# Patient Record
Sex: MALE | Race: WHITE | NOT HISPANIC OR LATINO
[De-identification: names, ages, dates, MRNs, and addresses within clinical notes are randomized per-mention and may not be internally consistent; named-entity substitution may affect disease eponyms.]

---
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
## 2019-01-04 ENCOUNTER — APPOINTMENT (OUTPATIENT)
Dept: ENDOCRINOLOGY | Facility: CLINIC | Age: 19
End: 2019-01-04
Payer: COMMERCIAL

## 2019-01-04 VITALS
BODY MASS INDEX: 18.94 KG/M2 | SYSTOLIC BLOOD PRESSURE: 110 MMHG | WEIGHT: 125 LBS | HEART RATE: 80 BPM | HEIGHT: 68 IN | DIASTOLIC BLOOD PRESSURE: 80 MMHG | OXYGEN SATURATION: 99 %

## 2019-01-04 DIAGNOSIS — Z78.9 OTHER SPECIFIED HEALTH STATUS: ICD-10-CM

## 2019-01-04 PROCEDURE — 99214 OFFICE O/P EST MOD 30 MIN: CPT

## 2019-01-04 RX ORDER — FLASH GLUCOSE SCANNING READER
EACH MISCELLANEOUS
Qty: 1 | Refills: 0 | Status: DISCONTINUED | COMMUNITY
Start: 2018-10-29 | End: 2019-01-04

## 2019-01-04 RX ORDER — GLUCAGON 1 MG
1 KIT INJECTION
Qty: 1 | Refills: 1 | Status: DISCONTINUED | COMMUNITY
Start: 2017-12-22 | End: 2019-01-04

## 2019-01-04 RX ORDER — ONDANSETRON 4 MG/1
4 TABLET, ORALLY DISINTEGRATING ORAL
Qty: 15 | Refills: 0 | Status: DISCONTINUED | COMMUNITY
Start: 2017-12-30 | End: 2019-01-04

## 2019-01-04 RX ORDER — LANCETS
EACH MISCELLANEOUS
Qty: 500 | Refills: 3 | Status: DISCONTINUED | COMMUNITY
Start: 2017-12-22 | End: 2019-01-04

## 2019-01-09 LAB
CREAT SPEC-SCNC: 215 MG/DL
GLUCOSE BLDC GLUCOMTR-MCNC: 158
HBA1C MFR BLD HPLC: 7.2
MICROALBUMIN 24H UR DL<=1MG/L-MCNC: 38.1 MG/DL
MICROALBUMIN/CREAT 24H UR-RTO: 177 MG/G

## 2019-03-06 ENCOUNTER — APPOINTMENT (OUTPATIENT)
Dept: PEDIATRIC GASTROENTEROLOGY | Facility: CLINIC | Age: 19
End: 2019-03-06
Payer: COMMERCIAL

## 2019-03-06 VITALS
DIASTOLIC BLOOD PRESSURE: 66 MMHG | WEIGHT: 130.07 LBS | SYSTOLIC BLOOD PRESSURE: 114 MMHG | HEART RATE: 88 BPM | BODY MASS INDEX: 19.49 KG/M2 | HEIGHT: 68.62 IN

## 2019-03-06 PROCEDURE — 99214 OFFICE O/P EST MOD 30 MIN: CPT

## 2019-03-12 ENCOUNTER — MEDICATION RENEWAL (OUTPATIENT)
Age: 19
End: 2019-03-12

## 2019-03-13 LAB
ENDOMYSIUM IGA SER QL: POSITIVE
ENDOMYSIUM IGA SER QL: POSITIVE
ENDOMYSIUM IGA TITR SER: ABNORMAL
ENDOMYSIUM IGA TITR SER: ABNORMAL
GLIADIN IGA SER QL: <5 UNITS
GLIADIN IGA SER QL: <5 UNITS
GLIADIN IGG SER QL: 5.4 UNITS
GLIADIN IGG SER QL: <5 UNITS
GLIADIN PEPTIDE IGA SER-ACNC: NEGATIVE
GLIADIN PEPTIDE IGA SER-ACNC: NEGATIVE
GLIADIN PEPTIDE IGG SER-ACNC: NEGATIVE
GLIADIN PEPTIDE IGG SER-ACNC: NEGATIVE
TTG IGA SER IA-ACNC: 57.4 UNITS
TTG IGA SER IA-ACNC: 8.2 U/ML
TTG IGA SER-ACNC: ABNORMAL
TTG IGA SER-ACNC: POSITIVE
TTG IGG SER IA-ACNC: 7.2 U/ML
TTG IGG SER IA-ACNC: 7.3 UNITS
TTG IGG SER IA-ACNC: ABNORMAL
TTG IGG SER IA-ACNC: NEGATIVE

## 2019-03-13 NOTE — REVIEW OF SYSTEMS
[Fever] : no fever [Icterus] : no icterus [Infections] : no infections [Asthma] : no asthma [Pneumonia] : no pneumonia [Murmur] : no murmur [Joint Pain] : no joint pain [AD(H)D] : no ADHD [Headache] : no headache [Anemia] : no anemia [Jaundice] : no jaundice [de-identified] : DM I

## 2019-03-13 NOTE — HISTORY OF PRESENT ILLNESS
[de-identified] : 17 yo young man with celiac disease and DM I returned for one of many follow-up visits.   He had two biopsies to define celiac disease and antibody status --- the first Marsh I; the second was clear of disease on the GFD..  He is into his second year of college at [employer] in [place].   (five visits ago visit was delayed and waiting 45 minutes since they put him in a room and did not notify me)   \sandhya Richardson presented in late 2015 after he was screened by Endocrinology for weight loss with his DMI but without abdominal pain, diarrhea, constipation, or other signs of celiac.   Antibodies were very high --- TTG-A >100 etc.   Biopsy November 2015 was unusual in that the distal duodenum was normal and bulb had lots of IEL's but no real damage and he was classified as Hendrickson I biopsy.   Options were offered and they chose to initiate the gluten free diet with repeat biopsy later.   He continued to feel well but his celiac antibodies particularly his TTG-A did not resolve as per routine and stayed elevated in ~ the upper 60's.   \par             He underwent repeat biopsy in October 2016 for both reasons ---the unresolving antibodies and his Hendrickson I original diagnosis.  On repeat there were no abnormalities seen.   He has continued on the gluten free diet which he says is very strict but at the same time at college he does eat out but at gluten free establishments.  He denies beer intake.    In his next to last visit in December, 2017  his TTG-A went slightly higher with the AGA somewhat lower.  When outright gluten accident occurs he may not have any symptoms.  Given the prior normal biopsies one could not make a significant statement about gluten intake.  On his last visit in July 2018 his TTG-A was higher and the AEA is 1:10 ).  \par          He continues with no symptoms.   He denies abdominal pain, vomiting, diarrhea, rashes, headaches, arthralgia or weight loss.   His weight has been fluctulated presently at the 12th %. He reports his DM I to be under good control.   He does not take MVI.

## 2019-03-13 NOTE — CONSULT LETTER
[Dear  ___] : Dear  [unfilled], [Consult Letter:] : I had the pleasure of evaluating your patient, [unfilled]. [Please see my note below.] : Please see my note below. [Consult Closing:] : Thank you very much for allowing me to participate in the care of this patient.  If you have any questions, please do not hesitate to contact me. [Sincerely,] : Sincerely, [FreeTextEntry3] : Mani Eastman MD, PhD\par

## 2019-03-13 NOTE — ASSESSMENT
[FreeTextEntry1] : Assessment:   celiac disease - strict on gluten free diet;  no complaints;\par                         positive celiac antibodies - unlike the routine situation, Dylan has never has never cleared his celiac antibodies after years of the GF diet.   He had a second biopsy in 2016 which at that time showed normal pathology despite the persistently elevated antibodies so the usual assessment is that his antibodies are slow to resolve.  Last visit there was an uptick in his TTG-A and since this was after his college experience we asked him to be especially vigilant regarding his gluten free diet for the next measurement.  He is now back after his next semester at college.\par                                               DM I - reported under good control;\par \par Plan: continue gluten free diet;\par         - encouraged MVI usage:\par        -obtain celiac antibodies and call for results;\par \par ADDENDUM: 3/13/19  MOTHER CALLED FOR RESULTS.   CELIAC ANTIBODIES SEEM IMPROVED IN THAT TTG-A WHICH HAD BEEN ALMOST AT 3X THE ULN WAS NOW SLIGHTLY OVER 2X THE ULN (METHOD AND NORMALS CHANGED DURING THIS INTERVAL).  OTHER ANTIBODIES SIMILAR.  IN GENERAL THIS SEEMS TO REFLECT SOME DEGREE OF STRICTNESS IN FOLLOWING THE GLUTEN FREE DIET EVEN AT COLLEGE.\par

## 2019-05-17 ENCOUNTER — APPOINTMENT (OUTPATIENT)
Dept: ENDOCRINOLOGY | Facility: CLINIC | Age: 19
End: 2019-05-17
Payer: COMMERCIAL

## 2019-05-17 VITALS
DIASTOLIC BLOOD PRESSURE: 80 MMHG | BODY MASS INDEX: 19.18 KG/M2 | SYSTOLIC BLOOD PRESSURE: 112 MMHG | HEIGHT: 68.62 IN | WEIGHT: 128 LBS | HEART RATE: 80 BPM | OXYGEN SATURATION: 98 %

## 2019-05-17 DIAGNOSIS — M79.10 MYALGIA, UNSPECIFIED SITE: ICD-10-CM

## 2019-05-17 LAB
ALBUMIN SERPL ELPH-MCNC: 4.7 G/DL
ALP BLD-CCNC: 66 U/L
ALT SERPL-CCNC: 15 U/L
ANION GAP SERPL CALC-SCNC: 10 MMOL/L
AST SERPL-CCNC: 16 U/L
BILIRUB SERPL-MCNC: 0.8 MG/DL
BUN SERPL-MCNC: 10 MG/DL
CALCIUM SERPL-MCNC: 10.2 MG/DL
CHLORIDE SERPL-SCNC: 101 MMOL/L
CHOLEST SERPL-MCNC: 137 MG/DL
CHOLEST/HDLC SERPL: 2.1 RATIO
CK SERPL-CCNC: 150 U/L
CO2 SERPL-SCNC: 29 MMOL/L
CREAT SERPL-MCNC: 0.72 MG/DL
CREAT SPEC-SCNC: 112 MG/DL
CRP SERPL-MCNC: <0.1 MG/DL
ERYTHROCYTE [SEDIMENTATION RATE] IN BLOOD BY WESTERGREN METHOD: 2 MM/HR
GLUCOSE BLDC GLUCOMTR-MCNC: 127
GLUCOSE SERPL-MCNC: 141 MG/DL
HBA1C MFR BLD HPLC: 8.9
HDLC SERPL-MCNC: 65 MG/DL
LDLC SERPL CALC-MCNC: 62 MG/DL
MICROALBUMIN 24H UR DL<=1MG/L-MCNC: 2.4 MG/DL
MICROALBUMIN/CREAT 24H UR-RTO: 21 MG/G
POTASSIUM SERPL-SCNC: 4.7 MMOL/L
PROT SERPL-MCNC: 7.1 G/DL
SODIUM SERPL-SCNC: 140 MMOL/L
TRIGL SERPL-MCNC: 50 MG/DL
TSH SERPL-ACNC: 2.44 UIU/ML

## 2019-05-17 PROCEDURE — 99214 OFFICE O/P EST MOD 30 MIN: CPT

## 2019-05-17 NOTE — ASSESSMENT
[Carbohydrate Consistent Diet] : carbohydrate consistent diet [Hypoglycemia Management] : hypoglycemia management [Glucagon Use] : glucagon use [Ketone Testing] : ketone testing [Diabetes Foot Care] : diabetes foot care [Long Term Vascular Complications] : long term vascular complications of diabetes [Self Monitoring of Blood Glucose] : self monitoring of blood glucose [Action and use of Insulin] : action and use of short and long-acting insulin [Insulin Self-Administration] : insulin self-administration [Injection Technique, Storage, Sharps Disposal] : injection technique, storage, and sharps disposal [Retinopathy Screening] : Patient was referred to ophthalmology for retinopathy screening [FreeTextEntry1] : 19yo male with type 1 diabetes on omnipod insulin pump and jose real time\par \par Type 1 diabetes: A1C improved, 6.9%\par I discussed with the patient and his parents the importance of glycemic control and targets.\par I went over Jose data, noted to have high glucose at around 6pm-8pm, 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  Sometimes over-correct and with hypoglycemia in the morning.  Does not want any changes in pump setting for now but will change behavior of avoiding the yogurt\par he does not want dexcom at this time because it does not link with omnipod\par he sees changes with exercise and can use temp basal for that\par he has celiac disease and is not always compliant with the diet.\par Insulin pump setting revised as below. \par 12AM - 3AM 0.850\par 3AM - 7 AM 0.700 \par 7AM - 12AM 0.800\par \par BOLUS SETTING\par ICR\par 12 am 1:12\par 12pm 1:10\par 5pm: 1:11\par 8pm: 1:12\par \par ISF 1:70\par INSULIN ACTIVE TIME 3 HOURS\par BG TARGET 120\par BG CORRECTION THRESHOLD 150\par \par Had normal cholesterol in May 2018, check today along with CMP\par \par MIcroalubminuria: continue with lisinopril. Check Microalbumin/creatinine ratio today. \par \par Celiac disease: Stable, continue to avoid gluten.\par \par Mucle weakness: Check ESR, CRP and Creatinine kinase, if elevated, consider rheum referral \par

## 2019-05-17 NOTE — ASSESSMENT
[Carbohydrate Consistent Diet] : carbohydrate consistent diet [Hypoglycemia Management] : hypoglycemia management [Glucagon Use] : glucagon use [Ketone Testing] : ketone testing [Diabetes Foot Care] : diabetes foot care [Long Term Vascular Complications] : long term vascular complications of diabetes [Self Monitoring of Blood Glucose] : self monitoring of blood glucose [Action and use of Insulin] : action and use of short and long-acting insulin [Injection Technique, Storage, Sharps Disposal] : injection technique, storage, and sharps disposal [Insulin Self-Administration] : insulin self-administration [Retinopathy Screening] : Patient was referred to ophthalmology for retinopathy screening [FreeTextEntry1] : 19yo male with type 1 diabetes on omnipod insulin pump and jose real time\par \par Type 1 diabetes: A1C improved, 6.9%\par I discussed with the patient and his parents the importance of glycemic control and targets.\par I went over Jose data, noted to have high glucose at around 6pm-8pm, 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  Sometimes over-correct and with hypoglycemia in the morning.  Does not want any changes in pump setting for now but will change behavior of avoiding the yogurt\par he does not want dexcom at this time because it does not link with omnipod\par he sees changes with exercise and can use temp basal for that\par he has celiac disease and is not always compliant with the diet.\par Insulin pump setting revised as below. \par 12AM - 3AM 0.850\par 3AM - 7 AM 0.700 \par 7AM - 12AM 0.800\par \par BOLUS SETTING\par ICR\par 12 am 1:12\par 12pm 1:10\par 5pm: 1:11\par 8pm: 1:12\par \par ISF 1:70\par INSULIN ACTIVE TIME 3 HOURS\par BG TARGET 120\par BG CORRECTION THRESHOLD 150\par \par Had normal cholesterol in May 2018, check today along with CMP\par \par MIcroalubminuria: continue with lisinopril. Check Microalbumin/creatinine ratio today. \par \par Celiac disease: Stable, continue to avoid gluten.\par \par Mucle weakness: Check ESR, CRP and Creatinine kinase, if elevated, consider rheum referral \par

## 2019-05-17 NOTE — HISTORY OF PRESENT ILLNESS
[FreeTextEntry1] : Patient is a 18-year-old male with history of type I diabetes, diagnosed in 2015.  He is here for follow up visit\par He also has celiac antibodies, avoids glutens.  \par He comes with his mother and father. He is a current sophomore in college.  Studying political science and prelaw. \par He had an episode of DKA in Oct 2018 due to gastroenteritis.  Resolved within 24 hours with fluid and insulin treatment.  \par He was diagnosed in May 2015. He was found to have ketones on routine exam and to be in DKA and was hospitalized.\par He has seen eye doctor last year does not have retinopathy or neuropathy.\par He has microalbuminuria and he is currently on lisinopril 2.5mg daily.\par \par In terms of diabetes treatment, he used insulin pen for about 4 months and has been on insulin pump since then.\par He uses omni pod and freestyle jasper sensor\par \par Today he feels well.  No complaints. \par \par UTD with eye doctor.  He reports he has been having some sharp/achy pain in the sole of his feet.  Also sometimes experience weakness in the thigh area.  Paternal grandmother with polymyositis, father is concerned. \par \par Regards to his celiac disease, he avoids fluid in his diet most of the time.  He denies any diarrhea.  He does have some difficulty with gaining weight. \par

## 2019-05-17 NOTE — PHYSICAL EXAM
[Alert] : alert [No Acute Distress] : no acute distress [Well Nourished] : well nourished [Well Developed] : well developed [Normal Sclera/Conjunctiva] : normal sclera/conjunctiva [No Proptosis] : no proptosis [EOMI] : extra ocular movement intact [Normal Oropharynx] : the oropharynx was normal [No Thyroid Nodules] : there were no palpable thyroid nodules [Thyroid Not Enlarged] : the thyroid was not enlarged [No Respiratory Distress] : no respiratory distress [No Accessory Muscle Use] : no accessory muscle use [Clear to Auscultation] : lungs were clear to auscultation bilaterally [Normal Rate] : heart rate was normal  [Normal S1, S2] : normal S1 and S2 [Regular Rhythm] : with a regular rhythm [Pedal Pulses Normal] : the pedal pulses are present [No Edema] : there was no peripheral edema [Normal Bowel Sounds] : normal bowel sounds [Not Tender] : non-tender [Soft] : abdomen soft [Not Distended] : not distended [Post Cervical Nodes] : posterior cervical nodes [Anterior Cervical Nodes] : anterior cervical nodes [Axillary Nodes] : axillary nodes [Normal] : normal and non tender [No Spinal Tenderness] : no spinal tenderness [Spine Straight] : spine straight [No Stigmata of Cushings Syndrome] : no stigmata of cushings syndrome [Normal Gait] : normal gait [Normal Strength/Tone] : muscle strength and tone were normal [No Rash] : no rash [Normal Reflexes] : deep tendon reflexes were 2+ and symmetric [No Tremors] : no tremors [Oriented x3] : oriented to person, place, and time [Acanthosis Nigricans] : no acanthosis nigricans

## 2019-06-03 ENCOUNTER — RX RENEWAL (OUTPATIENT)
Age: 19
End: 2019-06-03

## 2019-08-02 ENCOUNTER — TRANSCRIPTION ENCOUNTER (OUTPATIENT)
Age: 19
End: 2019-08-02

## 2019-08-02 ENCOUNTER — APPOINTMENT (OUTPATIENT)
Dept: ENDOCRINOLOGY | Facility: CLINIC | Age: 19
End: 2019-08-02
Payer: COMMERCIAL

## 2019-08-02 VITALS
BODY MASS INDEX: 19.77 KG/M2 | HEIGHT: 68.62 IN | WEIGHT: 132 LBS | DIASTOLIC BLOOD PRESSURE: 60 MMHG | OXYGEN SATURATION: 98 % | HEART RATE: 60 BPM | SYSTOLIC BLOOD PRESSURE: 100 MMHG

## 2019-08-02 LAB
GLUCOSE BLDC GLUCOMTR-MCNC: 129
HBA1C MFR BLD HPLC: 7.7

## 2019-08-02 PROCEDURE — 82962 GLUCOSE BLOOD TEST: CPT

## 2019-08-02 PROCEDURE — 99214 OFFICE O/P EST MOD 30 MIN: CPT | Mod: 25

## 2019-08-02 PROCEDURE — 83036 HEMOGLOBIN GLYCOSYLATED A1C: CPT | Mod: QW

## 2019-08-07 ENCOUNTER — APPOINTMENT (OUTPATIENT)
Dept: PEDIATRIC GASTROENTEROLOGY | Facility: CLINIC | Age: 19
End: 2019-08-07

## 2019-08-12 NOTE — HISTORY OF PRESENT ILLNESS
[FreeTextEntry1] : See jasper download, missing data from 3am to 6am. Pattern is still variable with higher glucose post breakfast time.

## 2019-08-12 NOTE — PHYSICAL EXAM
[Alert] : alert [No Acute Distress] : no acute distress [Well Nourished] : well nourished [Well Developed] : well developed [Normal Sclera/Conjunctiva] : normal sclera/conjunctiva [EOMI] : extra ocular movement intact [No Proptosis] : no proptosis [Normal Oropharynx] : the oropharynx was normal [Thyroid Not Enlarged] : the thyroid was not enlarged [No Thyroid Nodules] : there were no palpable thyroid nodules [No Respiratory Distress] : no respiratory distress [No Accessory Muscle Use] : no accessory muscle use [Clear to Auscultation] : lungs were clear to auscultation bilaterally [Normal Rate] : heart rate was normal  [Normal S1, S2] : normal S1 and S2 [Regular Rhythm] : with a regular rhythm [Pedal Pulses Normal] : the pedal pulses are present [No Edema] : there was no peripheral edema [Normal Bowel Sounds] : normal bowel sounds [Not Tender] : non-tender [Soft] : abdomen soft [Not Distended] : not distended [Post Cervical Nodes] : posterior cervical nodes [Anterior Cervical Nodes] : anterior cervical nodes [Axillary Nodes] : axillary nodes [No Spinal Tenderness] : no spinal tenderness [Spine Straight] : spine straight [No Stigmata of Cushings Syndrome] : no stigmata of cushings syndrome [Normal Gait] : normal gait [Normal Strength/Tone] : muscle strength and tone were normal [No Rash] : no rash [Normal] : normal [Full ROM] : with full range of motion [Normal Reflexes] : deep tendon reflexes were 2+ and symmetric [No Tremors] : no tremors [Oriented x3] : oriented to person, place, and time [Acanthosis Nigricans] : no acanthosis nigricans [Diminished Throughout Both Feet] : normal tactile sensation with monofilament testing throughout both feet

## 2019-08-12 NOTE — ASSESSMENT
[Carbohydrate Consistent Diet] : carbohydrate consistent diet [Hypoglycemia Management] : hypoglycemia management [Glucagon Use] : glucagon use [Ketone Testing] : ketone testing [Sick-Day Management] : sick-day management [Diabetes Foot Care] : diabetes foot care [Long Term Vascular Complications] : long term vascular complications of diabetes [Action and use of Insulin] : action and use of short and long-acting insulin [Importance of Diet and Exercise] : importance of diet and exercise to improve glycemic control, achieve weight loss and improve cardiovascular health [Self Monitoring of Blood Glucose] : self monitoring of blood glucose [Insulin Self-Administration] : insulin self-administration [Injection Technique, Storage, Sharps Disposal] : injection technique, storage, and sharps disposal [Retinopathy Screening] : Patient was referred to ophthalmology for retinopathy screening [FreeTextEntry1] : 19 year old male with type 1 diabetes on omni pod insulin pump and jasper real time\par \par #1 Type 1 diabete, moderately uncontrolled, A1C 7.7% today.  Does not want to change insulin pump setting, he attribute recent hyperglycemia due to stress/inomnia.  Informed patietnt to scan jasper sensor every 8 hours, specially around midnight if he's awake to get more data.  To e-mail me or CDE regarding download so we can make adjustments as needed.  \par he sees changes with exercise and can use temp basal for that\par he has celiac disease and is not always compliant with the diet.\par Insulin pump setting revised as below. \par 12AM - 3AM 0.850\par 3AM - 7 AM 0.700 \par 7AM - 12AM 0.800\par \par BOLUS SETTING\par ICR\par 12 am 1:12\par 12pm 1:10\par 5pm: 1:12\par \par ISF 1:70\par INSULIN ACTIVE TIME 3 HOURS\par BG TARGET 120\par BG CORRECTION THRESHOLD 150\par \par \par #2 Celiac disease: Stable, continue to avoid gluten.\par \par #3 foot pain/neuropathy?\par Referral for podiatry.  \par

## 2019-09-03 ENCOUNTER — RX RENEWAL (OUTPATIENT)
Age: 19
End: 2019-09-03

## 2019-11-27 ENCOUNTER — APPOINTMENT (OUTPATIENT)
Dept: PEDIATRIC GASTROENTEROLOGY | Facility: CLINIC | Age: 19
End: 2019-11-27
Payer: COMMERCIAL

## 2019-11-27 VITALS
WEIGHT: 133.6 LBS | HEIGHT: 69.33 IN | DIASTOLIC BLOOD PRESSURE: 69 MMHG | SYSTOLIC BLOOD PRESSURE: 122 MMHG | HEART RATE: 73 BPM | BODY MASS INDEX: 19.56 KG/M2

## 2019-11-27 PROCEDURE — 99213 OFFICE O/P EST LOW 20 MIN: CPT

## 2019-11-30 LAB
ENDOMYSIUM IGA SER QL: NEGATIVE
ENDOMYSIUM IGA SER QL: NEGATIVE
ENDOMYSIUM IGA TITR SER: NORMAL
ENDOMYSIUM IGA TITR SER: NORMAL
GLIADIN IGA SER QL: <5 UNITS
GLIADIN IGG SER QL: 5.7 UNITS
GLIADIN PEPTIDE IGA SER-ACNC: NEGATIVE
GLIADIN PEPTIDE IGG SER-ACNC: NEGATIVE
TTG IGA SER IA-ACNC: 5.3 U/ML
TTG IGA SER-ACNC: ABNORMAL
TTG IGG SER IA-ACNC: 9.1 U/ML
TTG IGG SER IA-ACNC: POSITIVE

## 2019-12-05 ENCOUNTER — CLINICAL ADVICE (OUTPATIENT)
Age: 19
End: 2019-12-05

## 2019-12-07 LAB
GLIADIN IGA SER QL: <5 UNITS
GLIADIN IGG SER QL: <5 UNITS
GLIADIN PEPTIDE IGA SER-ACNC: NEGATIVE
GLIADIN PEPTIDE IGG SER-ACNC: NEGATIVE
TTG IGA SER IA-ACNC: 5.4 U/ML
TTG IGA SER-ACNC: ABNORMAL
TTG IGG SER IA-ACNC: 5.6 U/ML
TTG IGG SER IA-ACNC: NEGATIVE

## 2019-12-07 NOTE — HISTORY OF PRESENT ILLNESS
[de-identified] : This is one of many follow-up visits for this now 19 year old young man who also has diabetes followed by our endocrinologists who is on a gluten free diet and is town for the Thanksgiving holiday since he is in college in [place], [place] ([facility] [employer] [facility] [employer]).\par \par At one of the prior follow-up visits there was a mix-up and a patient with a later appointment was seen first while they waited in the other room.   This appointment there again was a mix-up.  Their appointment was 2 PM and they arrived early about 1:43 when I was not present yet since I was coming over from nutrition rounds at the hospital.   Thus they were told I was not there yet and waited in the waiting room since it was warm in the patient rooms and they had already gone to the lab.   I arrived just before 2 PM and saw no patients in the rooms and noted that he was listed as being in the waiting room and therefore not ready.   I discussed scheduling in Dr. Jenkins's office.   It turns out the MOA's were only going to put him in a room after they saw me present since the patient and mother did not want to go in the room until I was in it.   The MOA's did not realize I was at 1991 until about 2:20 when they saw me in Dr. LITTLE's room.   I thus got to see Dylan at 2:23 when they thought that I had not been present until that time.\par \par Dylan is doing well and has no symptoms and says he is following a strict gluten free diet even at school.  He denies abdominal pain, diarrhea, constipation, weight loss, rashes, headaches, tiredness, or arthralgias.\par He had a repeat biopsy long ago for confirmation of his diagnosis which did show resolution.  Over the many  visits and antibody levels his values have occasionally increased instead of decreased but it has not been clearly associated with any gluten intake or problems.\par \par  He reports his DM I to be under good control.   He does not take MVI.\par \par His prior history is:\par Dylan presented in late 2015 after he was screened by Endocrinology for weight loss with his DMI but without abdominal pain, diarrhea, constipation, or other signs of celiac.   Antibodies were very high --- TTG-A >100 etc.   Biopsy November 2015 was unusual in that the distal duodenum was normal and bulb had lots of IEL's but no real damage and he was classified as Hendrickson I biopsy.   Options were offered and they chose to initiate the gluten free diet with repeat biopsy later.   He continued to feel well but his celiac antibodies particularly his TTG-A did not resolve as per routine and stayed elevated in ~ the upper 60's.   \par             He underwent repeat biopsy in October 2016 for both reasons ---the unresolving antibodies and his Hendrickson I original diagnosis.  On repeat there were no abnormalities seen.   He has continued on the gluten free diet which he says is very strict but at the same time at college he does eat out but at gluten free establishments.  He denies beer intake.    In his next to last visit in December, 2017  his TTG-A went slightly higher with the AGA somewhat lower.  When outright gluten accident occurs he may not have any symptoms.  Given the prior normal biopsies one could not make a significant statement about gluten intake.  On his last visit in July 2018 his TTG-A was higher and the AEA is 1:10 ).  \par  His weight has been fluctulated presently at the 12th %. He reports his DM I to be under good control.   He does not take MVI.

## 2019-12-07 NOTE — PHYSICAL EXAM
[Well Nourished] : well nourished [Well Developed] : well developed [NAD] : in no acute distress [Alert and Active] : alert and active [Verbal] : verbal [Appropriate Behavior] : appropriate behavior [icteric] : anicteric [Respiratory Distress] : no respiratory distress  [Distended] : non distended [Jaundice] : no jaundice [Edema] : no edema [FreeTextEntry2] : PER

## 2019-12-07 NOTE — CONSULT LETTER
[Dear  ___] : Dear  [unfilled], [Consult Letter:] : I had the pleasure of evaluating your patient, [unfilled]. [Consult Closing:] : Thank you very much for allowing me to participate in the care of this patient.  If you have any questions, please do not hesitate to contact me. [Please see my note below.] : Please see my note below. [Sincerely,] : Sincerely, [FreeTextEntry3] : Main Eastman MD, PhD\par  [FreeTextEntry1] : \par \par \par \par \par \par \par PLEASE SEE ADDENDUM IN ASSESSMENT-PLAN SECTION FOR DISCUSSION OF LAB VALUES.

## 2019-12-20 ENCOUNTER — APPOINTMENT (OUTPATIENT)
Dept: ENDOCRINOLOGY | Facility: CLINIC | Age: 19
End: 2019-12-20
Payer: COMMERCIAL

## 2019-12-20 VITALS
WEIGHT: 132 LBS | OXYGEN SATURATION: 98 % | BODY MASS INDEX: 19.33 KG/M2 | HEIGHT: 69.33 IN | DIASTOLIC BLOOD PRESSURE: 82 MMHG | SYSTOLIC BLOOD PRESSURE: 120 MMHG | HEART RATE: 80 BPM

## 2019-12-20 DIAGNOSIS — E10.9 TYPE 1 DIABETES MELLITUS W/OUT COMPLICATIONS: ICD-10-CM

## 2019-12-20 PROCEDURE — 99214 OFFICE O/P EST MOD 30 MIN: CPT

## 2019-12-24 NOTE — HISTORY OF PRESENT ILLNESS
[FreeTextEntry1] : Patient is a 19-year-old male with history of type I diabetes, diagnosed in 2015.  He is here for follow up visit. \par He also has celiac antibodies, avoids glutens.  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  With very variable schedule with school works.  \par \par He had an episode of DKA in Oct 2018 due to gastroenteritis.  Resolved within 24 hours with fluid and insulin treatment.  \par \par He was diagnosed in May 2015. He was found to have ketones on routine exam and to be in DKA and was hospitalized.\par \par He has seen eye doctor last year does not have retinopathy or neuropathy.\par \par He has microalbuminuria and he is currently on lisinopril 2.5mg daily.\par \par In terms of diabetes treatment, he used insulin pen for about 4 months and has been on insulin pump since then.\par \par He uses omni pod and FreeStyle jasper sensor\par \par Sleeps after 2 am, gets up at varies time, around 10am to 1pm. \par Lunch times is variable. \par Dinner is also very variable.  \par \par UTD with eye doctor.  His last visit was yesterday 12/19/2019.  He reports he has been having some sharp/achy pain in the sole of his feet. He had followed up with podiatry and it had resolved.  \par \par Regards to his celiac disease, he avoids fluid in his diet most of the time.  He denies any diarrhea. \par

## 2019-12-24 NOTE — ASSESSMENT
[FreeTextEntry1] : 19 year old male with type 1 diabetes on omni pod insulin pump and jasper real time\par \par #1 Type 1 diabete, moderately uncontrolled, A1C 7.7% today.  Does not want to change insulin pump setting, he attribute recent hyperglycemia due to stress/inomnia.  Informed patietnt to scan jasper sensor every 8 hours, specially around midnight if he's awake to get more data.  To e-mail me or CDE regarding download so we can make adjustments as needed.  \par he sees changes with exercise and can use temp basal for that\par he has celiac disease and is not always compliant with the diet.\par Insulin pump setting revised as below. \par 12AM - 3AM 0.850\par 3AM - 7 AM 0.7 -->  0.65\par 7AM - 12AM 0.800\par \par BOLUS SETTING\par ICR\par 12 am 1:12\par 12pm 1:10\par 5pm: 1:12 --> 1:10 \par \par ISF 1:70\par INSULIN ACTIVE TIME 3 HOURS\par BG TARGET 120\par BG CORRECTION THRESHOLD 150\par Eye exam yesterday. \par \par \par #2 Celiac disease: Stable, continue to avoid gluten.\par \par #3 foot pain/neuropathy?\par Referral for podiatry.  \par

## 2019-12-27 ENCOUNTER — RX RENEWAL (OUTPATIENT)
Age: 19
End: 2019-12-27

## 2019-12-27 LAB
ALBUMIN SERPL ELPH-MCNC: 4.7 G/DL
ALP BLD-CCNC: 66 U/L
ALT SERPL-CCNC: 18 U/L
ANION GAP SERPL CALC-SCNC: 11 MMOL/L
AST SERPL-CCNC: 13 U/L
BILIRUB SERPL-MCNC: 0.5 MG/DL
BUN SERPL-MCNC: 17 MG/DL
CALCIUM SERPL-MCNC: 10.2 MG/DL
CHLORIDE SERPL-SCNC: 102 MMOL/L
CO2 SERPL-SCNC: 25 MMOL/L
CREAT SERPL-MCNC: 1.38 MG/DL
CREAT SPEC-SCNC: 195 MG/DL
GLUCOSE BLDC GLUCOMTR-MCNC: 190
GLUCOSE SERPL-MCNC: 232 MG/DL
HBA1C MFR BLD HPLC: 7.9
MICROALBUMIN 24H UR DL<=1MG/L-MCNC: 9.7 MG/DL
MICROALBUMIN/CREAT 24H UR-RTO: 50 MG/G
POTASSIUM SERPL-SCNC: 5 MMOL/L
PROT SERPL-MCNC: 6.9 G/DL
SODIUM SERPL-SCNC: 138 MMOL/L

## 2020-01-03 ENCOUNTER — CLINICAL ADVICE (OUTPATIENT)
Age: 20
End: 2020-01-03

## 2020-01-03 LAB
ALBUMIN SERPL ELPH-MCNC: 4.6 G/DL
ALP BLD-CCNC: 57 U/L
ALT SERPL-CCNC: 20 U/L
ANION GAP SERPL CALC-SCNC: 11 MMOL/L
AST SERPL-CCNC: 18 U/L
BILIRUB SERPL-MCNC: 0.7 MG/DL
BUN SERPL-MCNC: 14 MG/DL
CALCIUM SERPL-MCNC: 10.1 MG/DL
CHLORIDE SERPL-SCNC: 101 MMOL/L
CO2 SERPL-SCNC: 28 MMOL/L
CREAT SERPL-MCNC: 0.69 MG/DL
CREAT SPEC-SCNC: 83 MG/DL
GLUCOSE SERPL-MCNC: 151 MG/DL
MICROALBUMIN 24H UR DL<=1MG/L-MCNC: <1.2 MG/DL
MICROALBUMIN/CREAT 24H UR-RTO: NORMAL MG/G
POTASSIUM SERPL-SCNC: 4.9 MMOL/L
PROT SERPL-MCNC: 7.1 G/DL
SODIUM SERPL-SCNC: 140 MMOL/L

## 2020-01-09 ENCOUNTER — RX RENEWAL (OUTPATIENT)
Age: 20
End: 2020-01-09

## 2020-01-11 ENCOUNTER — RX RENEWAL (OUTPATIENT)
Age: 20
End: 2020-01-11

## 2020-03-06 ENCOUNTER — APPOINTMENT (OUTPATIENT)
Dept: ENDOCRINOLOGY | Facility: CLINIC | Age: 20
End: 2020-03-06
Payer: COMMERCIAL

## 2020-03-06 VITALS
DIASTOLIC BLOOD PRESSURE: 70 MMHG | SYSTOLIC BLOOD PRESSURE: 120 MMHG | HEIGHT: 69.33 IN | WEIGHT: 137 LBS | HEART RATE: 76 BPM | OXYGEN SATURATION: 98 % | BODY MASS INDEX: 20.06 KG/M2

## 2020-03-06 LAB
GLUCOSE BLDC GLUCOMTR-MCNC: 200
HBA1C MFR BLD HPLC: 7.1

## 2020-03-06 PROCEDURE — 99214 OFFICE O/P EST MOD 30 MIN: CPT

## 2020-03-06 NOTE — HISTORY OF PRESENT ILLNESS
[FreeTextEntry1] : Patient is a 19-year-old male with history of type I diabetes, diagnosed in 2015.  He is here for follow up visit. \par He also has celiac antibodies, avoids glutens.  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  With very variable schedule with school works.  Reports that his insomnia had improved significantly during the semester.  He is currently on spring break.\par \par Patient with a recent viral illness, with cough, no fever.  Glucose has been fluctuating over the last few days due to viral illness, mostly running high.  States that he noticed today with does not eat a snack overnight he has hypo-glycemia in the morning.\par \par He had an episode of DKA in Oct 2018 due to gastroenteritis.  Resolved within 24 hours with fluid and insulin treatment.  \par \par He was diagnosed in May 2015. He was found to have ketones on routine exam and to be in DKA and was hospitalized.\par \par He has seen eye doctor last year does not have retinopathy or neuropathy.\par \par He has microalbuminuria and he is currently on lisinopril 2.5mg daily.  His last microalbumin was checked in November 2019 and found to be within normal limits.\par \par In terms of diabetes treatment, he used insulin pen for about 4 months and has been on insulin pump since then.\par \par He uses omni pod and FreeStyle jasper sensor, is considering T. Slim and Dexcom.\par \par Sleeps after 2 am, gets up at varies time, around 10am to 1pm. \par Lunch times is variable. \par Dinner is also very variable.  \par \par UTD with eye doctor.  His last visit was yesterday 12/19/2019.  He reports he has been having some sharp/achy pain in the sole of his feet. He had followed up with podiatry and it had resolved.  \par \par Regards to his celiac disease, he avoids fluid in his diet most of the time.  He denies any diarrhea. \par

## 2020-03-06 NOTE — ASSESSMENT
[FreeTextEntry1] : 19 year old male with type 1 diabetes on omni pod insulin pump and jasper real time\par \par #1 Type 1 diabetes, moderately uncontrolled, A1C 7.7% today.  Does not want to change insulin pump setting, he attribute recent hyperglycemia due to stress/insomnia.  Informed patient to scan jasper sensor every 8 hours, specially around midnight if he's awake to get more data.  To e-mail me or CDE regarding download so we can make adjustments as needed.  \par He sees changes with exercise and can use temp basal for that\par He has celiac disease and is not always compliant with the diet.\par Underwent a CGM study from February 22, 2020–March 6, 2020.  14 days of uninterrupted data were downloaded\par - Pt´s current therapeutic regimen includes: See diabetic therapy.\par - The patient´s blood sugars were in target 45 % of the time, above target 48 % of the time and below target 7 % of the time\par - Hypoglycemia: Below 70 mg/dL, less than 4%, with duration of 58 minutes total; below 54 mg/dL less than 1% with duration of 14 minutes total\par - Hyperglycemia: 25% 6-hour total.\par - Recommended therapeutic changes based on CGM Study:\par Patient with a recent viral illness, large fluctuations of glucose, did noted to have hypoglycemia in the morning.  Recommend patient to continue with CGM use.  Recommend to try taking a small snack at bedtime.  Recommend to consider decreasing basal overnight if it is not improved\par Insulin pump setting revised as below. \par 12AM - 3AM 0.850\par 3AM - 7 AM 0.65\par 7AM - 12AM 0.800\par \par BOLUS SETTING\par ICR\par 12 am 1:12\par 12pm 1:10\par 5pm: 1:12 --> 1:10 \par \par ISF 1:70\par INSULIN ACTIVE TIME 3 HOURS\par BG TARGET 120\par BG CORRECTION THRESHOLD 150\par Patient is considering closed-loop system with T slim and Dexcom with control IQ.  He will research into it and get back to us.\par \par #2 Celiac disease: Stable, continue to avoid gluten.\par \par #3 foot pain/neuropathy\par He saw podiatrist and now improved.  \par \par #4 microalbuminuria\par Patient is currently on lisinopril 2.5 mg once daily.  Microalbumin check in December 2020 is within normal limits.  We will continue with current regimen.\par  [Carbohydrate Consistent Diet] : carbohydrate consistent diet [Hypoglycemia Management] : hypoglycemia management [Glucagon Use] : glucagon use [Ketone Testing] : ketone testing [Sick-Day Management] : sick-day management [Diabetes Foot Care] : diabetes foot care [Long Term Vascular Complications] : long term vascular complications of diabetes [Importance of Diet and Exercise] : importance of diet and exercise to improve glycemic control, achieve weight loss and improve cardiovascular health [Action and use of Insulin] : action and use of short and long-acting insulin [Self Monitoring of Blood Glucose] : self monitoring of blood glucose [Insulin Self-Administration] : insulin self-administration [Injection Technique, Storage, Sharps Disposal] : injection technique, storage, and sharps disposal [Retinopathy Screening] : Patient was referred to ophthalmology for retinopathy screening

## 2020-03-06 NOTE — PHYSICAL EXAM
[Alert] : alert [No Acute Distress] : no acute distress [Well Nourished] : well nourished [Well Developed] : well developed [Normal Sclera/Conjunctiva] : normal sclera/conjunctiva [EOMI] : extra ocular movement intact [No Proptosis] : no proptosis [Normal Oropharynx] : the oropharynx was normal [Thyroid Not Enlarged] : the thyroid was not enlarged [No Thyroid Nodules] : there were no palpable thyroid nodules [No Respiratory Distress] : no respiratory distress [No Accessory Muscle Use] : no accessory muscle use [Clear to Auscultation] : lungs were clear to auscultation bilaterally [Normal Rate] : heart rate was normal  [Normal S1, S2] : normal S1 and S2 [Regular Rhythm] : with a regular rhythm [Normal Bowel Sounds] : normal bowel sounds [Not Tender] : non-tender [Soft] : abdomen soft [Not Distended] : not distended [Post Cervical Nodes] : posterior cervical nodes [Anterior Cervical Nodes] : anterior cervical nodes [Axillary Nodes] : axillary nodes [Normal] : normal and non tender [No Spinal Tenderness] : no spinal tenderness [Spine Straight] : spine straight [No Stigmata of Cushings Syndrome] : no stigmata of cushings syndrome [Normal Gait] : normal gait [Normal Strength/Tone] : muscle strength and tone were normal [No Rash] : no rash [Acanthosis Nigricans] : no acanthosis nigricans [Normal Reflexes] : deep tendon reflexes were 2+ and symmetric [No Tremors] : no tremors [Oriented x3] : oriented to person, place, and time [Normal Affect] : the affect was normal

## 2020-08-14 ENCOUNTER — APPOINTMENT (OUTPATIENT)
Dept: ENDOCRINOLOGY | Facility: CLINIC | Age: 20
End: 2020-08-14
Payer: COMMERCIAL

## 2020-08-14 VITALS
DIASTOLIC BLOOD PRESSURE: 80 MMHG | WEIGHT: 133 LBS | HEART RATE: 100 BPM | HEIGHT: 69.33 IN | BODY MASS INDEX: 19.48 KG/M2 | OXYGEN SATURATION: 97 % | SYSTOLIC BLOOD PRESSURE: 110 MMHG

## 2020-08-14 LAB
GLUCOSE BLDC GLUCOMTR-MCNC: 186
HBA1C MFR BLD HPLC: 7.6

## 2020-08-14 PROCEDURE — 95251 CONT GLUC MNTR ANALYSIS I&R: CPT

## 2020-08-14 PROCEDURE — 99214 OFFICE O/P EST MOD 30 MIN: CPT | Mod: 25

## 2020-08-14 RX ORDER — URINE ACETONE TEST STRIPS
STRIP MISCELLANEOUS
Qty: 1 | Refills: 1 | Status: ACTIVE | COMMUNITY
Start: 2017-12-22 | End: 1900-01-01

## 2020-08-14 NOTE — PHYSICAL EXAM
[Well Nourished] : well nourished [Alert] : alert [Well Developed] : well developed [No Acute Distress] : no acute distress [Normal Sclera/Conjunctiva] : normal sclera/conjunctiva [EOMI] : extra ocular movement intact [Thyroid Not Enlarged] : the thyroid was not enlarged [No Proptosis] : no proptosis [Normal Oropharynx] : the oropharynx was normal [No Respiratory Distress] : no respiratory distress [No Thyroid Nodules] : no palpable thyroid nodules [No Accessory Muscle Use] : no accessory muscle use [Normal S1, S2] : normal S1 and S2 [Clear to Auscultation] : lungs were clear to auscultation bilaterally [Normal Rate] : heart rate was normal [No Edema] : no peripheral edema [Regular Rhythm] : with a regular rhythm [Pedal Pulses Normal] : the pedal pulses are present [Not Tender] : non-tender [Normal Bowel Sounds] : normal bowel sounds [Soft] : abdomen soft [Not Distended] : not distended [Normal Anterior Cervical Nodes] : no anterior cervical lymphadenopathy [Spine Straight] : spine straight [No Spinal Tenderness] : no spinal tenderness [Normal Posterior Cervical Nodes] : no posterior cervical lymphadenopathy [No Stigmata of Cushings Syndrome] : no stigmata of Cushings Syndrome [Normal Gait] : normal gait [Normal Strength/Tone] : muscle strength and tone were normal [No Rash] : no rash [Acanthosis Nigricans] : no acanthosis nigricans [Normal Reflexes] : deep tendon reflexes were 2+ and symmetric [No Tremors] : no tremors [Oriented x3] : oriented to person, place, and time

## 2020-08-14 NOTE — ASSESSMENT
[FreeTextEntry1] : 19 year old male with type 1 diabetes on omni pod insulin pump and jasper real time\par \par #1 Type 1 diabetes, moderately uncontrolled, A1C 7.7% today.  Does not want to change insulin pump setting, he attribute recent hyperglycemia due to stress/insomnia.  Informed patient to scan jasper sensor every 8 hours, specially around midnight if he's awake to get more data.  To e-mail me or CDE regarding download so we can make adjustments as needed.  \par He sees changes with exercise and can use temp basal for that\par He has celiac disease and is not always compliant with the diet.\par Underwent a CGM study from February 22, 2020–March 6, 2020.  14 days of uninterrupted data were downloaded\par - Pt´s current therapeutic regimen includes: See diabetic therapy.\par - The patient´s blood sugars were in target 45 % of the time, above target 48 % of the time and below target 7 % of the time\par - Hypoglycemia: Below 70 mg/dL, less than 4%, with duration of 58 minutes total; below 54 mg/dL less than 1% with duration of 14 minutes total\par - Hyperglycemia: 25% 6-hour total.\par - Recommended therapeutic changes based on CGM Study:\par Patient with a recent viral illness, large fluctuations of glucose, did noted to have hypoglycemia in the morning.  Recommend patient to continue with CGM use.  Recommend to try taking a small snack at bedtime.  Recommend to consider decreasing basal overnight if it is not improved\par Underwent a CGM study from August 1, 2020–August 14, 2020.  14 days of uninterrupted data were downloaded\par ***\par - Pt´s current therapeutic regimen includes: Insulin pump with the above setting.\par - The patient´s blood sugars were in target 35 % of the time, above target 59 % of the time and below target 6 % of the time\par - Hypoglycemia: The patient had 3 low glucose events, with average duration of 235 minutes.  0 of these were severe (<50mg/dL or requiring assistance). The hypoglycemic events typically between 3 AM and 6 AM\par - Hyperglycemia: Between 12 AM to 3 AM between 12 PM to 12 AM\par - Recommended therapeutic changes based on CGM Study:\par Please see the following changes on insulin pump\par Insulin pump setting revised as below. \par 12AM - 3AM 0.850\par 3AM - 7 AM 0.60\par 7AM - 12AM 0.850\par \par BOLUS SETTING\par ICR\par 12 am 1:12\par 12pm 1:10\par 5pm: 1:10 \par \par ISF 1:70\par INSULIN ACTIVE TIME 3 HOURS\par BG TARGET 110\par BG CORRECTION THRESHOLD 140\par ***\par #2 Celiac disease: Stable, continue to avoid gluten.\par \par #3 foot pain/neuropathy\par He saw podiatrist and now improved.  \par \par #4 microalbuminuria\par Patient is currently on lisinopril 2.5 mg once daily.  Microalbumin check in December 2020 is within normal limits.  We will continue with current regimen. Check microablumin today. \par  [Diabetes Foot Care] : diabetes foot care [Long Term Vascular Complications] : long term vascular complications of diabetes [Carbohydrate Consistent Diet] : carbohydrate consistent diet [Exercise/Effect on Glucose] : exercise/effect on glucose [Importance of Diet and Exercise] : importance of diet and exercise to improve glycemic control, achieve weight loss and improve cardiovascular health [Hypoglycemia Management] : hypoglycemia management [Glucagon Use] : glucagon use [Action and use of Insulin] : action and use of short and long-acting insulin [Ketone Testing] : ketone testing [Self Monitoring of Blood Glucose] : self monitoring of blood glucose [Insulin Self-Administration] : insulin self-administration [Sick-Day Management] : sick-day management [Injection Technique, Storage, Sharps Disposal] : injection technique, storage, and sharps disposal [Retinopathy Screening] : Patient was referred to ophthalmology for retinopathy screening

## 2020-08-14 NOTE — HISTORY OF PRESENT ILLNESS
[FreeTextEntry1] : Patient is a 20-year-old male with history of type I diabetes, diagnosed in 2015.  He is here for follow up visit. \par He also has celiac antibodies, avoids glutens.  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  With very variable schedule with school works.  Reports that his insomnia had improved significantly during the semester.  Currently on summer break, going back to school in a couple of days.\par \par He had an episode of DKA in Oct 2018 due to gastroenteritis.  Resolved within 24 hours with fluid and insulin treatment.  \par \par He was diagnosed in May 2015. He was found to have ketones on routine exam and to be in DKA and was hospitalized.\par \par He has seen eye doctor last year does not have retinopathy or neuropathy.\par \par He has microalbuminuria and he is currently on lisinopril 2.5mg daily.  His last microalbumin was checked in November 2019 and found to be within normal limits.\par \par In terms of diabetes treatment, he used insulin pen for about 4 months and has been on insulin pump since then.\par \par He uses omni pod and FreeStyle jasper sensor, is considering T. Slim and Dexcom.\par \par He is up-to-date with his podiatrist seen within this year.  He is up-to-date with his ophthalmologist, also seen within this year.\par \par Regards to his celiac disease, he avoid gluten in his diet most of the time.  He denies any diarrhea. \par

## 2020-08-17 LAB
ALBUMIN SERPL ELPH-MCNC: 5.1 G/DL
ALP BLD-CCNC: 65 U/L
ALT SERPL-CCNC: 13 U/L
ANION GAP SERPL CALC-SCNC: 11 MMOL/L
AST SERPL-CCNC: 14 U/L
BILIRUB SERPL-MCNC: 0.8 MG/DL
BUN SERPL-MCNC: 12 MG/DL
CALCIUM SERPL-MCNC: 10.5 MG/DL
CHLORIDE SERPL-SCNC: 99 MMOL/L
CHOLEST SERPL-MCNC: 148 MG/DL
CHOLEST/HDLC SERPL: 2.7 RATIO
CO2 SERPL-SCNC: 29 MMOL/L
CREAT SERPL-MCNC: 0.79 MG/DL
CREAT SPEC-SCNC: 319 MG/DL
GLUCOSE SERPL-MCNC: 181 MG/DL
HDLC SERPL-MCNC: 54 MG/DL
LDLC SERPL CALC-MCNC: 83 MG/DL
MICROALBUMIN 24H UR DL<=1MG/L-MCNC: 26.1 MG/DL
MICROALBUMIN/CREAT 24H UR-RTO: 82 MG/G
POTASSIUM SERPL-SCNC: 4.9 MMOL/L
PROT SERPL-MCNC: 7.4 G/DL
SODIUM SERPL-SCNC: 139 MMOL/L
TRIGL SERPL-MCNC: 50 MG/DL
TSH SERPL-ACNC: 2.01 UIU/ML

## 2020-11-09 RX ORDER — FLASH GLUCOSE SENSOR
KIT MISCELLANEOUS
Qty: 6 | Refills: 1 | Status: DISCONTINUED | COMMUNITY
Start: 2019-01-04 | End: 2020-11-09

## 2020-11-09 RX ORDER — FLASH GLUCOSE SCANNING READER
EACH MISCELLANEOUS
Qty: 1 | Refills: 0 | Status: ACTIVE | COMMUNITY
Start: 2020-11-09

## 2020-11-09 RX ORDER — FLASH GLUCOSE SENSOR
KIT MISCELLANEOUS
Qty: 6 | Refills: 3 | Status: DISCONTINUED | COMMUNITY
Start: 2018-10-29 | End: 2020-11-09

## 2020-11-30 ENCOUNTER — APPOINTMENT (OUTPATIENT)
Dept: ENDOCRINOLOGY | Facility: CLINIC | Age: 20
End: 2020-11-30

## 2020-12-19 ENCOUNTER — TRANSCRIPTION ENCOUNTER (OUTPATIENT)
Age: 20
End: 2020-12-19

## 2020-12-21 ENCOUNTER — APPOINTMENT (OUTPATIENT)
Dept: ENDOCRINOLOGY | Facility: CLINIC | Age: 20
End: 2020-12-21
Payer: COMMERCIAL

## 2020-12-21 ENCOUNTER — RESULT CHARGE (OUTPATIENT)
Age: 20
End: 2020-12-21

## 2020-12-21 VITALS
SYSTOLIC BLOOD PRESSURE: 118 MMHG | BODY MASS INDEX: 21.23 KG/M2 | WEIGHT: 145 LBS | OXYGEN SATURATION: 98 % | DIASTOLIC BLOOD PRESSURE: 72 MMHG | TEMPERATURE: 97.3 F | HEART RATE: 83 BPM | HEIGHT: 69.3 IN

## 2020-12-21 DIAGNOSIS — R80.9 PROTEINURIA, UNSPECIFIED: ICD-10-CM

## 2020-12-21 LAB
CREAT SPEC-SCNC: 149 MG/DL
GLUCOSE BLDC GLUCOMTR-MCNC: 242
HBA1C MFR BLD HPLC: 7.7
MICROALBUMIN 24H UR DL<=1MG/L-MCNC: 2.5 MG/DL
MICROALBUMIN/CREAT 24H UR-RTO: 17 MG/G

## 2020-12-21 PROCEDURE — 95251 CONT GLUC MNTR ANALYSIS I&R: CPT

## 2020-12-21 PROCEDURE — 99072 ADDL SUPL MATRL&STAF TM PHE: CPT

## 2020-12-21 PROCEDURE — 82962 GLUCOSE BLOOD TEST: CPT

## 2020-12-21 PROCEDURE — 99214 OFFICE O/P EST MOD 30 MIN: CPT | Mod: 25

## 2020-12-21 NOTE — THERAPY
[Today's Date] : [unfilled] [Novolog] : Novolog [_____] :  [unfilled] mg/dL [Insulin on Board (IOB) Duration = ____ hours] : Insulin on Board (IOB) Duration  = [unfilled] hours [de-identified] : OMNIPOD [FreeTextEntry7] : none

## 2020-12-21 NOTE — HISTORY OF PRESENT ILLNESS
[FreeTextEntry1] : Patient is a 20-year-old male with history of type I diabetes, diagnosed in 2015.  He is here for follow up visit. \par He also has celiac antibodies, avoids glutens.  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  With very variable schedule with school works.  Reports that his insomnia had improved significantly during the semester.  Currently on summer break, going back to school in a couple of days.\par \par He had an episode of DKA in Oct 2018 due to gastroenteritis.  Resolved within 24 hours with fluid and insulin treatment.  \par \par He was diagnosed in May 2015. He was found to have ketones on routine exam and to be in DKA and was hospitalized.\par \par He has seen eye doctor last year does not have retinopathy or neuropathy.\par \par He has microalbuminuria and he is currently on lisinopril 2.5mg daily.  His last microalbumin was checked in November 2019 and found to be within normal limits.\par \par In terms of diabetes treatment, he used insulin pen for about 4 months and has been on insulin pump since then.\par \par He uses omni pod and FreeStyle jasper sensor, is considering T. Slim and Dexcom.\par \par He is up-to-date with his podiatrist seen within this year.  He is up-to-date with his ophthalmologist, also seen within this year.\par \par Regards to his celiac disease, he avoid gluten in his diet most of the time.  He denies any diarrhea. \par \par Morning: chicken ring and clam chowder and some fries\par 5:30pm: miso soup (12 carbs) Asian food \par \par Noticing more scar tissue.  Uses the insulin pump and sensor on his bilateral thighs. \par

## 2020-12-21 NOTE — ASSESSMENT
[FreeTextEntry1] : 19 year old male with type 1 diabetes on omni pod insulin pump and jose real time\par \par #1 Type 1 diabetes, moderately uncontrolled, A1C 7.7% today.  \par Underwent a CGM study from December 8, 2020–December 21, 2020.  14 days of uninterrupted data were downloaded\par - The reason for the study was hypoglycemia\par - Pt´s current therapeutic regimen includes: OmniPod insulin pump as above.\par - The patient´s blood sugars were in target 35% of the time, above target 64% of the time and below target 1% of the time\par - Hypoglycemia: The patient had 1 low glucose events, with average duration of 14 minutes.  None of these were severe (<50mg/dL or requiring assistance). The hypoglycemic events typically at noon time. \par - Hyperglycemia: overnight\par - Recommended therapeutic changes based on CGM Study:adjust insulin pump setting as below: \par Diabetes Therapy Regimen \par Date: 12/21/2020 \par Bolus Insulin: Novolog \par Pump Model: OMNIPOD \par Basal Rate \par Start Time: 0000 Basal: 0.850 units/hour ==> 0.900\par Start Time: 0300 Basal: 0.600 units/hour  ==> 0.700\par Start Time: 0700 Basal: 0.850 units/hour       \par Carb Ratios \par Start Time: 0000 Carb Ratios: 12 grams/unit \par Start Time: 1200 Carb Ratios: 10 grams/unit        \par Sensitvity \par Start Time: 0000 Sensitivity: 70 mg/dL/U   ==> 60\par BG Target Ranges \par Start Time: 0000 BG Target Ranges: 110-140 mg/dL         \par Correction Insulin: (Blood Glucose Minus Target) divided by sensitivity factor \par Insulin on Board (IOB) Duration = 3 hours \par To share Jose sensor download with us via View and Chew in 2 weeks\par Instruction for [employer] provided \par \par #2 Celiac disease: Stable, continue to avoid gluten.\par \par #3 foot pain/neuropathy\par He saw podiatrist and now improved.  \par \par #4 microalbuminuria\par Patient is currently on lisinopril 2.5 mg once daily.  Microalbumin check in December 2020 is within normal limits.  We will continue with current regimen. Check microablumin today. \par  [Diabetes Foot Care] : diabetes foot care [Long Term Vascular Complications] : long term vascular complications of diabetes [Carbohydrate Consistent Diet] : carbohydrate consistent diet [Importance of Diet and Exercise] : importance of diet and exercise to improve glycemic control, achieve weight loss and improve cardiovascular health [Exercise/Effect on Glucose] : exercise/effect on glucose [Hypoglycemia Management] : hypoglycemia management [Glucagon Use] : glucagon use [Ketone Testing] : ketone testing [Action and use of Insulin] : action and use of short and long-acting insulin [Self Monitoring of Blood Glucose] : self monitoring of blood glucose [Insulin Self-Administration] : insulin self-administration [Injection Technique, Storage, Sharps Disposal] : injection technique, storage, and sharps disposal [Sick-Day Management] : sick-day management [Retinopathy Screening] : Patient was referred to ophthalmology for retinopathy screening

## 2020-12-22 ENCOUNTER — NON-APPOINTMENT (OUTPATIENT)
Age: 20
End: 2020-12-22

## 2020-12-22 LAB
25(OH)D3 SERPL-MCNC: 30 NG/ML
GLIADIN IGA SER QL: <5 UNITS
GLIADIN IGG SER QL: 6.7 UNITS
GLIADIN PEPTIDE IGA SER-ACNC: NEGATIVE
GLIADIN PEPTIDE IGG SER-ACNC: NEGATIVE
TTG IGA SER IA-ACNC: 6.4 U/ML
TTG IGA SER-ACNC: ABNORMAL
TTG IGG SER IA-ACNC: 7 U/ML
TTG IGG SER IA-ACNC: ABNORMAL

## 2021-05-04 ENCOUNTER — APPOINTMENT (OUTPATIENT)
Dept: ENDOCRINOLOGY | Facility: CLINIC | Age: 21
End: 2021-05-04
Payer: COMMERCIAL

## 2021-05-04 VITALS
HEART RATE: 76 BPM | DIASTOLIC BLOOD PRESSURE: 74 MMHG | OXYGEN SATURATION: 97 % | WEIGHT: 142 LBS | TEMPERATURE: 97.8 F | SYSTOLIC BLOOD PRESSURE: 117 MMHG

## 2021-05-04 DIAGNOSIS — K90.0 CELIAC DISEASE: ICD-10-CM

## 2021-05-04 PROCEDURE — 82962 GLUCOSE BLOOD TEST: CPT

## 2021-05-04 PROCEDURE — 83036 HEMOGLOBIN GLYCOSYLATED A1C: CPT | Mod: QW

## 2021-05-04 PROCEDURE — 99072 ADDL SUPL MATRL&STAF TM PHE: CPT

## 2021-05-04 PROCEDURE — 99214 OFFICE O/P EST MOD 30 MIN: CPT | Mod: 25

## 2021-05-04 PROCEDURE — 95251 CONT GLUC MNTR ANALYSIS I&R: CPT

## 2021-05-04 NOTE — THERAPY
[Today's Date] : [unfilled] [Novolog] : Novolog [_____] :  [unfilled] mg/dL [Insulin on Board (IOB) Duration = ____ hours] : Insulin on Board (IOB) Duration  = [unfilled] hours [Max Bolus (units) = ____] : Max Bolus = [unfilled] units [de-identified] : OmniPod system serial #[id number]

## 2021-05-04 NOTE — ASSESSMENT
[FreeTextEntry1] : 19 year old male with type 1 diabetes on omni pod insulin pump and jasper real time\par \par 1.  Type 1 diabetes, moderately controlled\par A1c today 7.0%.  Improvement compared to last visit of 7.7%.\par Recently graduated from [employer], moving back to [place].  Might eventually moved to [place].\par Continue with current insulin pump setting.\par Date: 05/04/2021 \par Bolus Insulin: Novolog \par Pump Model: OmniPod system serial #[id number] \par Basal Rate \par Start Time: 0000 Basal: 0.9 units/hour \par Start Time: 0300 Basal: 0.7 units/hour \par Start Time: 0700 Basal: 0.850 units/hour  19.950 units/hour      \par Carb Ratios \par Start Time: Midnight Carb Ratios: 12 grams/unit \par Start Time: 12 PM Carb Ratios: 10 grams/unit        \par Sensitvity \par Start Time: Midnight Sensitivity: 70 mg/dL/U         \par BG Target Ranges \par Start Time: Midnight BG Target Ranges: 100–1 40 mg/dL         \par Correction Insulin: (Blood Glucose Minus Target) divided by sensitivity factor \par Insulin on Board (IOB) Duration = 3 hours hours \par Max Bolus =12 units\par Ophthalmology: Up-to-date\par Podiatry: Up-to-date, no further neuropathy complaints.\par \par 2.  Celiac disease: Stable, continue to avoid gluten.\par We will send blood work as per mom's request.  Will be forwarding a copy to his gastroenterologist to follow.\par \par 3.  Microalbuminuria\par Continue with lisinopril 2.5 mg once daily\par Repeat microalbumin/creatinine ratio tomorrow morning.\par \par \par  [Diabetes Foot Care] : diabetes foot care [Long Term Vascular Complications] : long term vascular complications of diabetes [Carbohydrate Consistent Diet] : carbohydrate consistent diet [Importance of Diet and Exercise] : importance of diet and exercise to improve glycemic control, achieve weight loss and improve cardiovascular health [Exercise/Effect on Glucose] : exercise/effect on glucose [Hypoglycemia Management] : hypoglycemia management [Glucagon Use] : glucagon use [Ketone Testing] : ketone testing [Action and use of Insulin] : action and use of short and long-acting insulin [Self Monitoring of Blood Glucose] : self monitoring of blood glucose [Insulin Self-Administration] : insulin self-administration [Injection Technique, Storage, Sharps Disposal] : injection technique, storage, and sharps disposal [Sick-Day Management] : sick-day management [Retinopathy Screening] : Patient was referred to ophthalmology for retinopathy screening

## 2021-05-04 NOTE — ADDENDUM
[FreeTextEntry1] : ==========\par CGM REPORT:\par ==========\par Patient underwent a CGM study from April 21, 2021–May 4, 2021.  14 days of uninterrupted data were downloaded\par The reason for the study was type 1 diabetes\par Pt´s current therapeutic regimen includes: Insulin pump with setting above\par The patient´s blood sugars were in target 55% of the time, above target 43% of the time and below target 2% of the time\par Average blood sugar was 185 mg/dL with glucose management indicator 7.7%, glucose variability of 42.2%.\par \par Hypoglycemia: The patient had to low glucose events, with average duration of 29 minutes.  None of these were severe (<50mg/dL or requiring assistance). The hypoglycemic events typically fasting\par \par Overnight trends appreciated were all for elevated\par Daytime trends appreciated were overall elevated and above goal with rapidly decreasing glucose\par \par Recommended therapeutic changes based on CGM Study: For now continue with current regimen, does not want to increase basal rate now.  Variable eating time and dietary modification can be done\par

## 2021-05-05 LAB
GLUCOSE BLDC GLUCOMTR-MCNC: 246
HBA1C MFR BLD HPLC: 7

## 2021-05-10 LAB
CHOLEST SERPL-MCNC: 159 MG/DL
CREAT SPEC-SCNC: 189 MG/DL
ENDOMYSIUM IGA SER QL: POSITIVE
ENDOMYSIUM IGA TITR SER: ABNORMAL
GLIADIN IGA SER QL: <5 UNITS
GLIADIN IGG SER QL: <5 UNITS
GLIADIN PEPTIDE IGA SER-ACNC: NEGATIVE
GLIADIN PEPTIDE IGG SER-ACNC: NEGATIVE
HDLC SERPL-MCNC: 70 MG/DL
LDLC SERPL CALC-MCNC: 77 MG/DL
MICROALBUMIN 24H UR DL<=1MG/L-MCNC: 4.2 MG/DL
MICROALBUMIN/CREAT 24H UR-RTO: 22 MG/G
NONHDLC SERPL-MCNC: 89 MG/DL
T4 FREE SERPL-MCNC: 1.2 NG/DL
TRIGL SERPL-MCNC: 62 MG/DL
TSH SERPL-ACNC: 1.59 UIU/ML
TTG IGA SER IA-ACNC: 5.8 U/ML
TTG IGA SER-ACNC: ABNORMAL
TTG IGG SER IA-ACNC: 6.1 U/ML
TTG IGG SER IA-ACNC: ABNORMAL

## 2021-05-18 ENCOUNTER — NON-APPOINTMENT (OUTPATIENT)
Age: 21
End: 2021-05-18

## 2021-08-04 ENCOUNTER — APPOINTMENT (OUTPATIENT)
Dept: ENDOCRINOLOGY | Facility: CLINIC | Age: 21
End: 2021-08-04
Payer: COMMERCIAL

## 2021-08-04 VITALS
HEIGHT: 69 IN | OXYGEN SATURATION: 98 % | HEART RATE: 74 BPM | SYSTOLIC BLOOD PRESSURE: 110 MMHG | WEIGHT: 144 LBS | BODY MASS INDEX: 21.33 KG/M2 | TEMPERATURE: 98.3 F | DIASTOLIC BLOOD PRESSURE: 60 MMHG

## 2021-08-04 DIAGNOSIS — Z96.41 PRESENCE OF INSULIN PUMP (EXTERNAL) (INTERNAL): ICD-10-CM

## 2021-08-04 DIAGNOSIS — R80.9 TYPE 1 DIABETES MELLITUS WITH OTHER DIABETIC KIDNEY COMPLICATION: ICD-10-CM

## 2021-08-04 DIAGNOSIS — Z46.81 ENCOUNTER FOR FITTING AND ADJUSTMENT OF INSULIN PUMP: ICD-10-CM

## 2021-08-04 DIAGNOSIS — E10.65 TYPE 1 DIABETES MELLITUS WITH HYPERGLYCEMIA: ICD-10-CM

## 2021-08-04 DIAGNOSIS — R89.4 ABNORMAL IMMUNOLOGICAL FINDINGS IN SPECIMENS FROM OTHER ORGANS, SYSTEMS AND TISSUES: ICD-10-CM

## 2021-08-04 DIAGNOSIS — E10.29 TYPE 1 DIABETES MELLITUS WITH OTHER DIABETIC KIDNEY COMPLICATION: ICD-10-CM

## 2021-08-04 LAB — HBA1C MFR BLD HPLC: 6.4

## 2021-08-04 PROCEDURE — 83036 HEMOGLOBIN GLYCOSYLATED A1C: CPT | Mod: QW

## 2021-08-04 PROCEDURE — 99214 OFFICE O/P EST MOD 30 MIN: CPT | Mod: 25

## 2021-08-04 RX ORDER — INSULIN ASPART 100 [IU]/ML
100 INJECTION, SOLUTION INTRAVENOUS; SUBCUTANEOUS
Qty: 90 | Refills: 3 | Status: ACTIVE | COMMUNITY
Start: 2019-08-02 | End: 1900-01-01

## 2021-08-04 NOTE — ASSESSMENT
[FreeTextEntry1] : 19 year old male with type 1 diabetes on omni pod insulin pump and jasper real time\par \par 1.  Type 1 diabetes, moderately controlled\par A1c today 7.0%.  Improvement compared to last visit of 7.7%.\par Recently graduated from [employer], moving back to [place].  Might eventually moved to [place].\par Continue with current insulin pump setting.\par CGM pattern variable, sometimes over-bolus leading to post-prandial hypoglycemia. \par Date: 08/04/2021 \par Bolus Insulin: Novolog \par Pump Model: OmniPod system serial #[id number] \par Basal Rate \par Start Time: 0000 Basal: 0.9 units/hour \par Start Time: 0300 Basal: 0.7 units/hour \par Start Time: 0700 Basal: 0.850 units/hour  19.950 units/hour      \par Carb Ratios \par Start Time: Midnight Carb Ratios: 12 grams/unit \par Start Time: 12 PM Carb Ratios: 10 grams/unit        \par Sensitvity \par Start Time: Midnight Sensitivity: 70 mg/dL/U         \par BG Target Ranges \par Start Time: Midnight BG Target Ranges: 100–1 40 mg/dL         \par Correction Insulin: (Blood Glucose Minus Target) divided by sensitivity factor \par Insulin on Board (IOB) Duration = 3 hours hours \par Max Bolus =12 units\par Ophthalmology: Up-to-date\par Podiatry: Up-to-date, there is no neuropathy complaints.\par \par 2.  Celiac disease: Stable, continue to avoid gluten.\par We will send blood work as per mom's request.  Will be forwarding a copy to his gastroenterologist to follow.\par \par 3.  Microalbuminuria\par Continue with lisinopril 2.5 mg once daily\par Repeat microalbumin/creatinine ratio today. \par \par Patient is relocating to [place], he already scheduled an appointment with new endocrinologist in Jan. (new insurance will be active then).  \par  [Diabetes Foot Care] : diabetes foot care [Long Term Vascular Complications] : long term vascular complications of diabetes [Carbohydrate Consistent Diet] : carbohydrate consistent diet [Importance of Diet and Exercise] : importance of diet and exercise to improve glycemic control, achieve weight loss and improve cardiovascular health [Exercise/Effect on Glucose] : exercise/effect on glucose [Hypoglycemia Management] : hypoglycemia management [Glucagon Use] : glucagon use [Ketone Testing] : ketone testing [Action and use of Insulin] : action and use of short and long-acting insulin [Self Monitoring of Blood Glucose] : self monitoring of blood glucose [Insulin Self-Administration] : insulin self-administration [Injection Technique, Storage, Sharps Disposal] : injection technique, storage, and sharps disposal [Sick-Day Management] : sick-day management [Retinopathy Screening] : Patient was referred to ophthalmology for retinopathy screening

## 2021-08-04 NOTE — HISTORY OF PRESENT ILLNESS
[FreeTextEntry1] : Patient is a 20-year-old male with history of type I diabetes, diagnosed in 2015.  He is here for follow up visit. \par He also has celiac antibodies, avoids glutens.  \par \par He just graduated from [employer].  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  \par \par He had an episode of DKA in Oct 2018 due to gastroenteritis.  Resolved within 24 hours with fluid and insulin treatment.  \par \par He was diagnosed in May 2015. He was found to have ketones on routine exam and to be in DKA and was hospitalized.\par \par He has seen eye doctor this year, and he has no history o neuropathy.  \par \par He has microalbuminuria and he is currently on lisinopril 2.5mg daily.  His last microalbumin was checked in November 2019 and found to be within normal limits.\par \par He uses omni pod and FreeStyle jasper sensor.  He does not like the Dexcom G6 sensor currently.  \par \par Regards to his celiac disease, he avoid gluten in his diet most of the time.  He denies any diarrhea.  Following up with gastroenterologist.

## 2021-08-09 LAB
CREAT SPEC-SCNC: 94 MG/DL
MICROALBUMIN 24H UR DL<=1MG/L-MCNC: 3.4 MG/DL
MICROALBUMIN/CREAT 24H UR-RTO: 36 MG/G

## 2021-09-02 RX ORDER — FLASH GLUCOSE SENSOR
KIT MISCELLANEOUS
Qty: 6 | Refills: 3 | Status: ACTIVE | COMMUNITY
Start: 2020-11-09 | End: 1900-01-01

## 2021-09-21 LAB
CREAT SPEC-SCNC: 169 MG/DL
MICROALBUMIN 24H UR DL<=1MG/L-MCNC: <1.2 MG/DL
MICROALBUMIN/CREAT 24H UR-RTO: NORMAL MG/G

## 2022-02-07 RX ORDER — LISINOPRIL 2.5 MG/1
2.5 TABLET ORAL
Qty: 90 | Refills: 3 | Status: ACTIVE | COMMUNITY
Start: 2018-05-07 | End: 1900-01-01

## 2023-01-18 LAB
ENDOMYSIUM IGA SER QL: POSITIVE
ENDOMYSIUM IGA TITR SER: ABNORMAL